# Patient Record
Sex: FEMALE | URBAN - METROPOLITAN AREA
[De-identification: names, ages, dates, MRNs, and addresses within clinical notes are randomized per-mention and may not be internally consistent; named-entity substitution may affect disease eponyms.]

---

## 2021-11-08 ENCOUNTER — HOSPITAL ENCOUNTER (OUTPATIENT)
Dept: LAB | Age: 21
Discharge: HOME | End: 2021-11-08

## 2021-11-08 ENCOUNTER — INITIAL PRENATAL (OUTPATIENT)
Dept: FAMILY MEDICINE CLINIC | Age: 21
End: 2021-11-08

## 2021-11-08 VITALS
SYSTOLIC BLOOD PRESSURE: 101 MMHG | RESPIRATION RATE: 16 BRPM | HEART RATE: 93 BPM | DIASTOLIC BLOOD PRESSURE: 48 MMHG | HEIGHT: 62 IN | BODY MASS INDEX: 28.12 KG/M2 | OXYGEN SATURATION: 100 % | WEIGHT: 152.8 LBS

## 2021-11-08 DIAGNOSIS — Z3A.24 24 WEEKS GESTATION OF PREGNANCY: Primary | ICD-10-CM

## 2021-11-08 DIAGNOSIS — O09.30 LATE PRENATAL CARE: ICD-10-CM

## 2021-11-08 LAB
COMMENT, HOLDF: NORMAL
SAMPLES BEING HELD,HOLD: NORMAL

## 2021-11-08 PROCEDURE — 0500F INITIAL PRENATAL CARE VISIT: CPT | Performed by: STUDENT IN AN ORGANIZED HEALTH CARE EDUCATION/TRAINING PROGRAM

## 2021-11-08 PROCEDURE — 88175 CYTOPATH C/V AUTO FLUID REDO: CPT

## 2021-11-08 NOTE — PROGRESS NOTES
I reviewed with the resident the medical history and the resident's findings on the physical examination. I discussed with the resident the patient's diagnosis and concur with the plan. 20yo  @ 24w5d by LMP  1. IUP: IOB labs,  will need sens with GBS, declines genetic testing   2.   Late to care: at 24 wk, measuring 28cm, [facility] but pt states it was just to get a picture of the baby and was not dated, check UDS, scheduling MFM scan

## 2021-11-08 NOTE — PATIENT INSTRUCTIONS
Precauciones en el embarazo: Instrucciones de cuidado  Pregnancy Precautions: Care Instructions  Instrucciones de cuidado     No hay sharonda manera fallon de prevenir el trabajo de parto antes de la fecha esperada (trabajo de parto prematuro) o de prevenir la mayoría de otros problemas en el [facility]. Lois hay cosas que puede hacer para aumentar las probabilidades de tener un embarazo saludable. Vaya a roma citas, siga los consejos de green médico y cuídese. Coma darryl y steve ejercicio (si green médico lo permite). Y asegúrese de rossi abundante agua. La atención de seguimiento es sharonda parte clave de green tratamiento y seguridad. Asegúrese de hacer y acudir a todas las citas, y llame a green médico si está teniendo problemas. También es sharonda buena idea saber los resultados de roma exámenes y mantener sharonda lista de los medicamentos que diann. ¿Cómo puede cuidarse en el hogar? · Asegúrese de asistir a las citas prenatales. Green médico le tomará la presión arterial en cada consulta. Green médico también comprobará si tiene proteínas en green orina. Tanto la presión arterial doc lary la presencia de proteínas en la orina son señales de preeclampsia. Esta afección puede ser peligrosa tanto para usted lary para green bebé. · Cordelia mucho líquido. La deshidratación puede causar contracciones. Si tiene sharonda enfermedad renal, cardíaca o hepática y tiene que restringir los líquidos, hable con green médico antes de aumentar la cantidad de líquido que dion. · Informe a green médico de inmediato si nota algún síntoma de infección, lary:  ? Ardor cuando orina. ? Flujo con mal olor de la vagina. ? Comezón en la vagina. ? Berenice Shown sin explicación. ? Dolor o sensibilidad inusual en el útero o la parte baja del abdomen. · Aliméntese en forma equilibrada. Incluya muchos alimentos con alto contenido de calcio y jayson. ? Entre los alimentos ricos en calcio se incluyen la [place], el queso, el yogur, [place] y el brócoli. ?  Entre los alimentos ricos en jayson se incluyen las saba barker, los [place], las aves, los [place], los frijoles, las uvas pasas, el pan de grano integral y las verduras de hojas verdes. · No fume. Si necesita ayuda para dejar de fumar, hable con green médico sobre programas y medicamentos para dejar de fumar. Estos pueden aumentar roma probabilidades de dejar el hábito para siempre. · No memo alcohol ni consuma marihuana o drogas ilegales. · Siga las instrucciones de green médico acerca de la [place]. Green médico le dirá cuánto ejercicio puede hacer. · Pregúntele a green médico si puede tener [employer]. Si usted está en riesgo de tener trabajo de [place], green médico podría pedirle que no tenga relaciones sexuales. · [place] precauciones para prevenir las caídas. Keren el embarazo las articulaciones están más sueltas y se tiene menos equilibrio. Los deportes tales lary el ciclismo, el esquí o el patinaje en línea pueden aumentar el riesgo de caídas. Y no monte a silvia, krystle en motocicleta, steve clavados, steve esquí acuático, bucee, ni salte en paracaídas mientras está embarazada. · Evite calentarse demasiado. No use saunas ni bañeras de hidromasaje. Evite la exposición al sol en climas calientes por mucho tiempo. [place] acetaminofén (Tylenol) para bajar sharonda fiebre doc. · No tome medicamentos de venta harrison, productos herbarios ni suplementos sin hablar radha con green médico o farmacéutico.  ¿Cuándo debe pedir ayuda? Llame al 911  en cualquier momento que considere que necesita atención de [place]. Por ejemplo, llame si:    · Se desmayó (perdió el conocimiento).     · Tiene convulsiones.     · Tiene sangrado vaginal intenso.     · Tiene dolor intenso en el abdomen o la pelvis.     · Le sale abundante líquido o gotea de la vagina y sabe o elena que el cordón umbilical se está saliendo a green vagina. Si esto sucede, arrodíllese de inmediato, de lionel forma que roma nalgas estén más altas que green barbie.  [place] disminuirá la presión [employer] el cordón umbilical hasta que llegue la ayuda. Llame a green médico ahora mismo o busque atención médica inmediata si:    · Tiene señales de preeclampsia, lary:  ? Hinchazón repentina de la kailey, las yossi o los pies. ? Nuevos problemas de visión (lary oscurecimiento, ирина borroso o ирина puntos). ? Dolor de barbie intenso.     · Tiene cualquier sangrado vaginal.     · Tiene dolor o cólicos abdominales.     · Tiene fiebre.     · Filipe Rave tenido contracciones regulares (con o sin dolor) por Sherie Letwan. [place] significa que tiene 8 o más contracciones en 1 hora o que tiene 4 contracciones o más en 20 minutos después de [place] de posición y rossi líquidos.     · Tiene sharonda pérdida repentina de líquido por la vagina.     · Tiene dolor en la parte baja de la espalda o presión en la pelvis que no desaparece.     · Nota que green bebé ha dejado de moverse o se mueve mucho menos de lo normal.   Preste especial atención a los cambios en green latonia y asegúrese de comunicarse con green médico si tiene algún problema. ¿Dónde puede encontrar más información en inglés? Sharonda Tyrell a http://www.gray.com/  Pepe [id number] en la búsqueda para aprender más acerca de \"Precauciones en el embarazo: Instrucciones de cuidado. \"  Revisado: 16 junio, 2021               Versión del contenido: 13.0  © 6998-0783 Healthwise, Incorporated. Las instrucciones de cuidado fueron adaptadas bajo licencia por [employer] (which disclaims liability or warranty for this information). Si usted tiene [place] afección médica o sobre estas instrucciones, siempre pregunte a green profesional de latonia. Healthwise, Incorporated niega toda garantía o responsabilidad por green uso de esta información. Semanas 22 a 26 de green embarazo:  Instrucciones de cuidado  Weeks 22 to 26 of Your Pregnancy: Care Instructions  Instrucciones de cuidado    Al comenzar el 7.º mes de green embarazo en la semana 26, los pulmones de green bebé se están volviendo más zoila y se están preparando para respirar. Podría notar que green bebé responde al lee de green voz o la de green abbe. También podría notar que green bebé se voltea y [place] menos de posición, y se retuerce o sacude más. Por lo general, las sacudidas indican que green bebé tiene hipo. Tener hipo es totalmente normal y dura poco tiempo. Shine vez sea buena idea pensar en asistir a sharonda clase de preparación para el parto. Angelina es también un buen momento para comenzar a pensar si desea que radha el parto le administren un analgésico (medicamento para el dolor). A la mayoría de las mujeres embarazadas les hacen pruebas para la diabetes gestacional entre las semanas 24 y 29. La diabetes gestacional ocurre cuando el nivel de azúcar en la janey es muy alto radha el [facility]. La prueba es importante, porque usted puede tener diabetes gestacional y no saberlo. Lois esta enfermedad puede causarle problemas a green bebé. La atención de seguimiento es sharonda parte clave de green tratamiento y seguridad. Asegúrese de hacer y acudir a todas las citas, y llame a green médico si está teniendo problemas. También es sharonda buena idea saber los resultados de roma exámenes y mantener sharonda lista de los medicamentos que diann. ¿Cómo puede cuidarse en el hogar? Alivie las molestias de las patadas de green bebé  · Cambie de posición. A veces, angelina cambio hace que green bebé también cambie de posición. · Respire hondo al mismo tiempo que levanta los brazos sobre green [place]. Después exhale a medida que baja los brazos. Nrainder los ejercicios de Kegel para evitar pérdidas de [facility]  · Latosha Kumari ejercicios de Kegel estando mcdowell o sentada. ? Apriete los mismos músculos que usaría para detener el chorro de [facility]. El abdomen y los muslos no deben moverse. ? Manténgalos apretados radha 3 segundos y, luego, relájelos otros 3 segundos. ? Empiece con 3 segundos. Nakul Mao, añada 1 patrice cada semana hasta que sea capaz de apretar radha 10 segundos.   ? Repita el ejercicio entre 10 y 13 veces [address] Narinder abel o más sesiones cada día. Alivie o reduzca la hinchazón de los pies, los tobillos, las yossi y los dedos  · Si tiene los dedos hinchados, quítese los [place]. · No coma alimentos con mucha sal, lary maggie fritas. · Coloque roma pies sobre un banco o un sofá todo el tiempo que le sea posible. Duerma con almohadas debajo de los pies. · No se quede de pie radha mucho tiempo ni use calzado ajustado. · Use medias elásticas de soporte. ¿Dónde puede encontrar más información en inglés? Vaya a http://www.gray.com/  Pepe [id number] en la búsqueda para aprender más acerca de \"Semanas 22 a 26 de green embarazo: Instrucciones de cuidado. \"  Revisado: 16 junio, 2021               Versión del contenido: 13.0  © 1536-1384 Healthwise, Incorporated. Las instrucciones de cuidado fueron adaptadas bajo licencia por [employer] (which disclaims liability or warranty for this information). Si usted tiene [place] afección médica o sobre estas instrucciones, siempre pregunte a green profesional de latonia. [employer] niega toda garantía o responsabilidad por green uso de esta información.

## 2021-11-08 NOTE — Clinical Note
Pt measuring larger than dates. Suspect she is farther along than stated. Late to care too. Can we schedule her for anatomy scan this week if possible?

## 2021-11-08 NOTE — PROGRESS NOTES
Chief Complaint   Patient presents with    Initial Prenatal Visit     , LMP 21. +FM. Patient had an [address] and given the due date of  or 2021.        Visit Vitals  BP (!) 101/48 (BP 1 Location: Left arm, BP Patient Position: Sitting, BP Cuff Size: Adult)   Pulse 93   Resp 16   Ht 5' 2.21\" (1.58 m)   Wt 152 lb 12.8 oz (69.3 kg)   SpO2 100%   BMI 27.76 kg/m²

## 2021-11-08 NOTE — PROGRESS NOTES
Subjective:   Shell Carvalho is a 19 y.o.   at 24w5d with AGUSTÍN of 22 based on LMP 13 (certain) , who is being seen today for her first initial obstetrical visit. This is a planned pregnancy. Positive test and had free US done at [address] [facility] around 21 weeks      History of Depression in pregnancy or post partum? no  History of GDM or DM? no  History of GHTN or HTN? no  History of pre-eclampsia? no  History of thyroid disorder? no  History of PTD?no  History of ? no  History of Genetic disorders? no  History of STD's? no  History of abnormal PAP?- NEVER had a pap   Taking prenatal vitamins? Yes     Allergies- reviewed: Allergies   Allergen Reactions    Penicillins Rash and Itching       Medications- reviewed:   Current Outpatient Medications   Medication Sig    PNV no.153/FA/om3/dha/epa/fish (PRENATAL GUMMIES PO) Take  by mouth. No current facility-administered medications for this visit. Past Medical History- reviewed:  History reviewed. No pertinent past medical history. Past Surgical History- reviewed:   History reviewed. No pertinent surgical history.     Social History- reviewed:  Social History     Socioeconomic History    Marital status: SINGLE     Spouse name: Not on file    Number of children: Not on file    Years of education: Not on file    Highest education level: Not on file   Occupational History    Not on file   Tobacco Use    Smoking status: Never Smoker    Smokeless tobacco: Never Used   Substance and Sexual Activity    Alcohol use: Not on file    Drug use: Not on file    Sexual activity: Yes     Partners: Male     Birth control/protection: None   Other Topics Concern    Not on file   Social History Narrative    Not on file     Social Determinants of Health     Financial Resource Strain:     Difficulty of Paying Living Expenses: Not on file   Food Insecurity:     Worried About Running Out of Food in the Last Year: Not on file    Ran Out of Food in the Last Year: Not on file   Transportation Needs:     Lack of Transportation (Medical): Not on file    Lack of Transportation (Non-Medical):  Not on file   Physical Activity:     Days of Exercise per Week: Not on file    Minutes of Exercise per Session: Not on file   Stress:     Feeling of Stress : Not on file   Social Connections:     Frequency of Communication with Friends and Family: Not on file    Frequency of Social Gatherings with Friends and Family: Not on file    Attends Gnosticism Services: Not on file    Active Member of Clubs or Organizations: Not on file    Attends Club or Organization Meetings: Not on file    Marital Status: Not on file   Intimate Partner Violence:     Fear of Current or Ex-Partner: Not on file    Emotionally Abused: Not on file    Physically Abused: Not on file    Sexually Abused: Not on file   Housing Stability:     Unable to Pay for Housing in the Last Year: Not on file    Number of Places Lived in the Last Year: Not on file    Unstable Housing in the Last Year: Not on file         Objective:     Visit Vitals  BP (!) 101/48 (BP 1 Location: Left arm, BP Patient Position: Sitting, BP Cuff Size: Adult)   Pulse 93   Resp 16   Ht 5' 2.21\" (1.58 m)   Wt 152 lb 12.8 oz (69.3 kg)   LMP 05/19/2021   SpO2 100%   BMI 27.76 kg/m²       See physical exam on flowsheet  Pelvic exam chaperoned by Jessa Cisneros LPN      Assessment and Plan:         ICD-10-CM ICD-9-CM    1. 24 weeks gestation of pregnancy  Z3A.24 V22.2 TYPE & SCREEN      CBC W/O DIFF      CULTURE, URINE      HEMOGLOBIN FRACTIONATION      HEP B SURFACE AG      HIV 1/2 AG/AB, 4TH GENERATION,W RFLX CONFIRM      RPR      RUBELLA AB, IGG      VZV AB, IGG      CT/NG/T.VAGINALIS AMPLIFICATION      PAP IG, RFX APTIMA HPV ASCUS ([id number])      VZV AB, IGG      RUBELLA AB, IGG      RPR      HIV 1/2 AG/AB, 4TH GENERATION,W RFLX CONFIRM      HEP B SURFACE AG      HEMOGLOBIN FRACTIONATION      CBC W/O DIFF      TYPE & SCREEN      PAP IG, RFX APTIMA HPV ASCUS ([id number])      CT/NG/T.VAGINALIS AMPLIFICATION      CULTURE, URINE   2. Late prenatal care  O09.30 V23.7 DRUG SCREEN, URINE      DRUG SCREEN, URINE       19 y.o. Manoj Jones at 24w5d by LMP with [facility] 2/23/2022 here for initial OB visit   · IOB labs collected . · Continue PNV   · Recommend exercise in pregnancy at least 3 or more times weekly, mild to moderate intensity. Avoid activities that cause abdominal trauma. · Discussed appropriate diet during pregnancy, foods to avoid and stressed importance of hydration   · Declined genetic testing   · Request for MFM anatomy scan will be scheduled    · Follow up in 2 weeks    PREGNANCY PROBLEMS    Late to care: [address] at [place] [address] at 21 weeks. No prenatal care prior to this. -UDS today     Measuring larger than dates: suspect her dating is off although she states she is certain of her LMP.   No prior [facility]   -will arrange for anatomy scan with MFM this week if possible   -no prior h/o GDM so will hold off on HA1c     PCN allergy- needs sensitivities with GBS testing     Orders Placed This Encounter    CULTURE, URINE     Standing Status:   Future     Number of Occurrences:   1     Standing Expiration Date:   11/8/2022    CBC W/O DIFF     Standing Status:   Future     Number of Occurrences:   1     Standing Expiration Date:   11/8/2022    HEMOGLOBIN FRACTIONATION     Standing Status:   Future     Number of Occurrences:   1     Standing Expiration Date:   11/8/2022    HEP B SURFACE AG     Standing Status:   Future     Number of Occurrences:   1     Standing Expiration Date:   11/8/2022    HIV 1/2 AG/AB, 4TH GENERATION,W RFLX CONFIRM     Standing Status:   Future     Number of Occurrences:   1     Standing Expiration Date:   11/8/2022    RPR     Standing Status:   Future     Number of Occurrences:   1     Standing Expiration Date:   11/8/2022    RUBELLA AB, IGG     Standing Status:   Future     Number of Occurrences:   1 Standing Expiration Date:   11/8/2022    VZV AB, IGG     Standing Status:   Future     Number of Occurrences:   1     Standing Expiration Date:   11/8/2022    CT/NG/T.VAGINALIS AMPLIFICATION     Standing Status:   Future     Number of Occurrences:   1     Standing Expiration Date:   11/8/2022     Order Specific Question:   Specimen source     Answer:   Urine [258]    DRUG SCREEN, URINE     Standing Status:   Future     Number of Occurrences:   1     Standing Expiration Date:   11/8/2022    TYPE & SCREEN     ENTER SURGERY DATE IF FOR PRE-OP TESTING. Standing Status:   Future     Number of Occurrences:   1     Standing Expiration Date:   11/8/2022     Order Specific Question:   Has patient been transfused or pregnant in the last 3 mos. ? Answer:   Unknown    PNV no.153/FA/om3/dha/epa/fish (PRENATAL GUMMIES PO)     Sig: Take  by mouth.  PAP IG, RFX APTIMA HPV ASCUS ([id number])     Standing Status:   Future     Number of Occurrences:   1     Standing Expiration Date:   11/8/2022     Order Specific Question:   Pap Source? Answer:   Cervical     Order Specific Question:   Total Hysterectomy? Answer:   No     Order Specific Question:   Supracervical Hysterectomy? Answer:   No     Order Specific Question:   Post Menopausal?     Answer:   No     Order Specific Question:   Hormone Therapy? Answer:   No     Order Specific Question:   IUD? Answer:   No     Order Specific Question:   Abnormal Bleeding? Answer:   No     Order Specific Question:   Pregnant     Answer:   Yes     Order Specific Question:   Post Partum? Answer:   No         I have discussed the diagnosis with the patient and the intended plan as seen in the above orders. The patient has received an after-visit summary and questions were answered concerning future plans. I have discussed medication side effects and warnings with the patient as well.  Informed pt to return to the office or go to the ER if she experiences vaginal bleeding, vaginal discharge, leaking of fluid, pelvic cramping.       Pt seen and discussed with Dr. Adolfo Kline  (attending physician)    Enid Kincaid DO  Family Medicine Resident

## 2021-11-09 LAB
ABO + RH BLD: NORMAL
AMPHET UR QL SCN: NEGATIVE
BARBITURATES UR QL SCN: NEGATIVE
BENZODIAZ UR QL: NEGATIVE
BLOOD BANK CMNT PATIENT-IMP: NORMAL
BLOOD GROUP ANTIBODIES SERPL: NORMAL
CANNABINOIDS UR QL SCN: NEGATIVE
COCAINE UR QL SCN: NEGATIVE
DRUG SCRN COMMENT,DRGCM: NORMAL
ERYTHROCYTE [DISTWIDTH] IN BLOOD BY AUTOMATED COUNT: 13.4 % (ref 11.5–14.5)
HBV SURFACE AG SER QL: 0.23 INDEX
HBV SURFACE AG SER QL: NEGATIVE
HCT VFR BLD AUTO: 33.1 % (ref 35–47)
HGB BLD-MCNC: 10.3 G/DL (ref 11.5–16)
HIV 1+2 AB+HIV1 P24 AG SERPL QL IA: NONREACTIVE
HIV12 RESULT COMMENT, HHIVC: NORMAL
MCH RBC QN AUTO: 28.2 PG (ref 26–34)
MCHC RBC AUTO-ENTMCNC: 31.1 G/DL (ref 30–36.5)
MCV RBC AUTO: 90.7 FL (ref 80–99)
METHADONE UR QL: NEGATIVE
NRBC # BLD: 0 K/UL (ref 0–0.01)
NRBC BLD-RTO: 0 PER 100 WBC
OPIATES UR QL: NEGATIVE
PCP UR QL: NEGATIVE
PLATELET # BLD AUTO: 233 K/UL (ref 150–400)
PMV BLD AUTO: 11.5 FL (ref 8.9–12.9)
RBC # BLD AUTO: 3.65 M/UL (ref 3.8–5.2)
RPR SER QL: NONREACTIVE
RUBV IGG SER-IMP: REACTIVE
RUBV IGG SERPL IA-ACNC: 11.4 IU/ML
SPECIMEN EXP DATE BLD: NORMAL
WBC # BLD AUTO: 9.5 K/UL (ref 3.6–11)

## 2021-11-10 ENCOUNTER — HOSPITAL ENCOUNTER (OUTPATIENT)
Dept: PERINATAL CARE | Age: 21
Discharge: HOME | End: 2021-11-10
Attending: OBSTETRICS & GYNECOLOGY

## 2021-11-10 LAB — VZV IGG SER IA-ACNC: 447 INDEX

## 2021-11-11 ENCOUNTER — TELEPHONE (OUTPATIENT)
Dept: FAMILY MEDICINE CLINIC | Age: 21
End: 2021-11-11

## 2021-11-11 DIAGNOSIS — O99.019 ANTEPARTUM ANEMIA: Primary | ICD-10-CM

## 2021-11-11 LAB
HGB A MFR BLD: 97.3 % (ref 96.4–98.8)
HGB A2 MFR BLD COLUMN CHROM: 2.7 % (ref 1.8–3.2)
HGB F MFR BLD: 0 % (ref 0–2)
HGB FRACT BLD-IMP: NORMAL
HGB S MFR BLD: 0 %

## 2021-11-11 RX ORDER — LANOLIN ALCOHOL/MO/W.PET/CERES
325 CREAM (GRAM) TOPICAL
Qty: 180 TABLET | Refills: 1
Start: 2021-11-11 | End: 2022-02-09

## 2021-11-11 NOTE — TELEPHONE ENCOUNTER
Called patient with the interpretor [phone]. Did not answer, left VM. If she calls back please tell her to Start iron BID. RX unable to be sent as no pharmacy listed (please obtain this information from her). Mayito Hunter D.O.    Family Medicine Resident PGY3

## 2021-11-11 NOTE — PROGRESS NOTES
PNL: O pos  Ab neg, G/C pending*, VZV/Rubella immune,  RPR neg, HepB/HIV neg, Hgb frac wnl,  HgB 10.3 , Plt okay   UDS neg   Ucx GBS bacteruria (only 15K colony counts present not enough to treat) Pt will need prophylaxis. Given PCN allergy will call lab and get clinda sensitivities added on.

## 2021-11-12 LAB
C TRACH RRNA SPEC QL NAA+PROBE: NEGATIVE
N GONORRHOEA RRNA SPEC QL NAA+PROBE: NEGATIVE
SPECIMEN SOURCE: NORMAL
T VAGINALIS RRNA VAG QL NAA+PROBE: NEGATIVE

## 2021-11-13 LAB
BACTERIA SPEC CULT: ABNORMAL
CC UR VC: ABNORMAL
SERVICE CMNT-IMP: ABNORMAL

## 2021-11-17 ENCOUNTER — TELEPHONE (OUTPATIENT)
Dept: FAMILY MEDICINE CLINIC | Age: 21
End: 2021-11-17

## 2021-11-17 NOTE — TELEPHONE ENCOUNTER
Hi! Pt. Called to discuss about her Lab results that was taken last 11/08. Also they are moving to [facility] this coming week and she's asking if they could have a referral that we know in [facility] for her appointments.  Thanks    -[employer]

## 2021-12-05 PROBLEM — R82.71 GBS BACTERIURIA: Status: ACTIVE | Noted: 2021-12-05

## 2021-12-05 PROBLEM — O99.013 ANEMIA AFFECTING PREGNANCY IN THIRD TRIMESTER: Status: ACTIVE | Noted: 2021-12-05
